# Patient Record
Sex: MALE | Race: WHITE | HISPANIC OR LATINO | Employment: FULL TIME | URBAN - NONMETROPOLITAN AREA
[De-identification: names, ages, dates, MRNs, and addresses within clinical notes are randomized per-mention and may not be internally consistent; named-entity substitution may affect disease eponyms.]

---

## 2024-09-28 ENCOUNTER — HOSPITAL ENCOUNTER (EMERGENCY)
Facility: HOSPITAL | Age: 44
Discharge: HOME | End: 2024-09-28
Attending: STUDENT IN AN ORGANIZED HEALTH CARE EDUCATION/TRAINING PROGRAM
Payer: OTHER MISCELLANEOUS

## 2024-09-28 VITALS
OXYGEN SATURATION: 96 % | BODY MASS INDEX: 41.75 KG/M2 | SYSTOLIC BLOOD PRESSURE: 139 MMHG | HEART RATE: 89 BPM | DIASTOLIC BLOOD PRESSURE: 97 MMHG | RESPIRATION RATE: 20 BRPM | HEIGHT: 73 IN | WEIGHT: 315 LBS | TEMPERATURE: 97.7 F

## 2024-09-28 DIAGNOSIS — M54.6 ACUTE MIDLINE THORACIC BACK PAIN: Primary | ICD-10-CM

## 2024-09-28 PROCEDURE — 99283 EMERGENCY DEPT VISIT LOW MDM: CPT

## 2024-09-28 RX ORDER — CYCLOBENZAPRINE HCL 10 MG
10 TABLET ORAL ONCE
Status: COMPLETED | OUTPATIENT
Start: 2024-09-28 | End: 2024-09-28

## 2024-09-28 RX ORDER — LIDOCAINE 50 MG/G
1 PATCH TOPICAL EVERY 24 HOURS
Qty: 15 EACH | Refills: 0 | Status: SHIPPED | OUTPATIENT
Start: 2024-09-28

## 2024-09-28 RX ORDER — NAPROXEN 500 MG/1
500 TABLET ORAL 2 TIMES DAILY PRN
Qty: 14 TABLET | Refills: 0 | Status: SHIPPED | OUTPATIENT
Start: 2024-09-28

## 2024-09-28 RX ORDER — CYCLOBENZAPRINE HCL 5 MG
5 TABLET ORAL 3 TIMES DAILY PRN
Qty: 30 TABLET | Refills: 0 | Status: SHIPPED | OUTPATIENT
Start: 2024-09-28

## 2024-09-28 RX ORDER — LIDOCAINE 4 G/G
1 PATCH TOPICAL
Status: DISCONTINUED | OUTPATIENT
Start: 2024-09-28 | End: 2024-09-28 | Stop reason: HOSPADM

## 2024-09-28 RX ORDER — NAPROXEN 500 MG/1
500 TABLET ORAL ONCE
Status: COMPLETED | OUTPATIENT
Start: 2024-09-28 | End: 2024-09-28

## 2024-09-28 RX ADMIN — LIDOCAINE 1 PATCH: 4 PATCH TOPICAL at 19:23

## 2024-09-28 RX ADMIN — CYCLOBENZAPRINE 10 MG: 10 TABLET, FILM COATED ORAL at 19:23

## 2024-09-28 RX ADMIN — NAPROXEN 500 MG: 500 TABLET ORAL at 19:23

## 2024-09-28 NOTE — Clinical Note
[facility] EMERGENCY DEPARTMENT  [address] [address]  Phone: [phone]    Bladimri RuizGustavoAlexey was seen and treated in our emergency department on 9/28/2024.  He may return to work on 10/02/2024.  Return to duty as tolerated       Thank you for choosing [facility].    Neo Romero MD

## 2024-09-28 NOTE — Clinical Note
[facility] EMERGENCY DEPARTMENT  [address] [address]  Phone: [phone]    Bladimir RuizGustavoAlexey was seen and treated in our emergency department on 9/28/2024.  He may return to work on 09/30/2024.  Return to duty as tolerated       Thank you for choosing [facility].    Neo Romero MD

## 2024-09-28 NOTE — DISCHARGE INSTRUCTIONS
You were evaluated for back pain.  We got physical exam and were not concerned for a surgical issue.  We have treated you symptomatically and would recommend that you follow-up with your primary care doctor to ensure that your back pain improves appropriately.  As we discussed, we believe that this pain is secondary to muscle strain.  If you have any weakness in your legs or other neurologic symptoms, please come back to the my department for further evaluation.  You are now stable for discharge.

## 2024-09-28 NOTE — ED PROVIDER NOTES
Subjective:  History of Present Illness:    Patient is a 43-year-old male with history of hypertension who presents today with back injury.  Reports he was at work and hurt his back while pulling on a chain.  Says that he has pain to the midline of his back.  Denies any trauma to the area.  No history of cancer.  No history of IV drug use.  No history of chronic steroid use.  Denies any history of malignancy.  No history of sepsis.  Juarez to our emergency department with no focal weakness      Nurses Notes reviewed and agree, including vitals, allergies, social history and prior medical history.     REVIEW OF SYSTEMS: All systems reviewed and not pertinent unless noted.  Review of Systems   Constitutional:  Positive for activity change. Negative for appetite change, chills, fatigue and fever.   HENT:  Negative for congestion, sinus pressure, sneezing and trouble swallowing.    Eyes:  Negative for discharge and itching.   Respiratory:  Negative for cough and shortness of breath.    Cardiovascular:  Negative for chest pain and palpitations.   Gastrointestinal:  Negative for abdominal distention and abdominal pain.   Endocrine: Negative for cold intolerance and heat intolerance.   Genitourinary:  Negative for decreased urine volume, dysuria and urgency.   Musculoskeletal:  Positive for back pain. Negative for gait problem, neck pain and neck stiffness.   Skin:  Negative for color change and rash.   Allergic/Immunologic: Negative for immunocompromised state.   Neurological:  Negative for facial asymmetry and headaches.   Hematological:  Negative for adenopathy.   Psychiatric/Behavioral:  Negative for self-injury and suicidal ideas.        History reviewed. No pertinent past medical history.    Allergies:    Morphine and Penicillins      History reviewed. No pertinent surgical history.      Social History     Socioeconomic History    Marital status:          History reviewed. No pertinent family  "history.    Objective  Physical Exam:  /97   Pulse 89   Temp 97.7 °F (36.5 °C) (Oral)   Resp 20   Ht 185.4 cm (73\")   Wt (!) 145 kg (319 lb)   SpO2 96%   BMI 42.09 kg/m²      Physical Exam  Constitutional:       General: He is not in acute distress.     Appearance: Normal appearance. He is normal weight. He is not ill-appearing.   HENT:      Head: Normocephalic and atraumatic.      Nose: Nose normal. No congestion or rhinorrhea.      Mouth/Throat:      Mouth: Mucous membranes are moist.      Pharynx: Oropharynx is clear.   Eyes:      Extraocular Movements: Extraocular movements intact.      Conjunctiva/sclera: Conjunctivae normal.      Pupils: Pupils are equal, round, and reactive to light.   Cardiovascular:      Rate and Rhythm: Normal rate and regular rhythm.      Pulses: Normal pulses.   Pulmonary:      Effort: Pulmonary effort is normal. No respiratory distress.      Breath sounds: Normal breath sounds.   Abdominal:      General: Abdomen is flat. Bowel sounds are normal. There is no distension.      Palpations: Abdomen is soft.      Tenderness: There is no abdominal tenderness.   Musculoskeletal:         General: Tenderness and signs of injury present. No swelling. Normal range of motion.      Cervical back: Normal range of motion and neck supple. No rigidity or tenderness.      Comments: Tenderness palpation to the thoracic spine with no obvious step-off or deformity noted   Skin:     General: Skin is warm and dry.      Capillary Refill: Capillary refill takes less than 2 seconds.   Neurological:      General: No focal deficit present.      Mental Status: He is alert and oriented to person, place, and time. Mental status is at baseline.      Cranial Nerves: No cranial nerve deficit.      Sensory: No sensory deficit.      Motor: No weakness.   Psychiatric:         Mood and Affect: Mood normal.         Behavior: Behavior normal.         Thought Content: Thought content normal.         Judgment: " Judgment normal.         Procedures    ED Course:         Lab Results (last 24 hours)       ** No results found for the last 24 hours. **             No radiology results from the last 24 hrs       MDM      Initial impression of presenting illness: Back pain    DDX: includes but is not limited to: Muscle strain, compression fracture, epidural abscess    Patient arrives stable with vitals interpreted by myself.     Pertinent features from physical exam: To auscultation, regular rate and rhythm, no murmur, nontender to abdominal palpation, patient endorsing tenderness to palpation to the thoracic spine with no obvious step-off or deformity.    Initial diagnostic plan: No need for further imaging    Results from initial plan were reviewed and interpreted by me revealing see above    Diagnostic information from other sources: This patient's wife at bedside    Interventions / Re-evaluation: Given naproxen, Flexeril, Lidoderm patch    Results/clinical rationale were discussed with patient at bedside    Consultations/Discussion of results with other physicians: Discussed diagnosis of back strain.  Will treat symptomatically and will have patient follow-up with his primary care doctor and occupational health for further management of this pain.  Strict return precaution for any unilateral weakness or new neurologic symptoms.    Disposition plan: Discharge  -----        Final diagnoses:   Acute midline thoracic back pain          Neo Romero MD  09/28/24 2014

## 2024-11-08 ENCOUNTER — OFFICE VISIT (OUTPATIENT)
Dept: NEUROLOGY | Facility: CLINIC | Age: 44
End: 2024-11-08
Payer: MEDICAID

## 2024-11-08 VITALS
HEART RATE: 92 BPM | RESPIRATION RATE: 14 BRPM | BODY MASS INDEX: 41.75 KG/M2 | DIASTOLIC BLOOD PRESSURE: 102 MMHG | TEMPERATURE: 98.2 F | HEIGHT: 73 IN | OXYGEN SATURATION: 97 % | SYSTOLIC BLOOD PRESSURE: 164 MMHG | WEIGHT: 315 LBS

## 2024-11-08 DIAGNOSIS — G43.011 INTRACTABLE MIGRAINE WITHOUT AURA AND WITH STATUS MIGRAINOSUS: Primary | ICD-10-CM

## 2024-11-08 DIAGNOSIS — G43.909 ACUTE MIGRAINE: ICD-10-CM

## 2024-11-08 RX ORDER — ESCITALOPRAM OXALATE 10 MG/1
1 TABLET ORAL DAILY
COMMUNITY
Start: 2024-09-09

## 2024-11-08 RX ORDER — NORTRIPTYLINE HYDROCHLORIDE 10 MG/1
10 CAPSULE ORAL
COMMUNITY
Start: 2024-10-11 | End: 2024-11-08 | Stop reason: SDUPTHER

## 2024-11-08 RX ORDER — LISINOPRIL AND HYDROCHLOROTHIAZIDE 20; 25 MG/1; MG/1
1 TABLET ORAL DAILY
COMMUNITY
Start: 2024-09-09

## 2024-11-08 RX ORDER — PROPRANOLOL HCL 20 MG
20 TABLET ORAL 2 TIMES DAILY
Qty: 60 TABLET | Refills: 3 | Status: SHIPPED | OUTPATIENT
Start: 2024-11-08

## 2024-11-08 RX ORDER — RIMEGEPANT SULFATE 75 MG/75MG
75 TABLET, ORALLY DISINTEGRATING ORAL AS NEEDED
Qty: 3 TABLET | Refills: 0 | COMMUNITY
Start: 2024-11-08

## 2024-11-08 RX ORDER — NORTRIPTYLINE HYDROCHLORIDE 10 MG/1
10 CAPSULE ORAL
Qty: 30 CAPSULE | Refills: 3 | Status: SHIPPED | OUTPATIENT
Start: 2024-11-08

## 2024-11-08 RX ORDER — RIMEGEPANT SULFATE 75 MG/75MG
75 TABLET, ORALLY DISINTEGRATING ORAL DAILY PRN
Qty: 9 TABLET | Refills: 3 | Status: SHIPPED | OUTPATIENT
Start: 2024-11-08

## 2024-11-08 RX ORDER — PANTOPRAZOLE SODIUM 40 MG/1
1 TABLET, DELAYED RELEASE ORAL DAILY
COMMUNITY
Start: 2024-09-09

## 2024-11-08 RX ORDER — ROSUVASTATIN CALCIUM 20 MG/1
1 TABLET, COATED ORAL DAILY
COMMUNITY
Start: 2024-09-09

## 2024-11-08 NOTE — PROGRESS NOTES
New Patient Office Visit      Patient Name: Bladimir Uribe  : 1980   MRN: [id number]     Referring Physician: Zoe Goldberg*    Chief Complaint:    Chief Complaint   Patient presents with    Establish Care     HA; pt reports he has had HA for about a year. Pt reports 30/30 HA days.    Medications tried: tylenol, aleve.        History of Present Illness: Bladimir Uribe is a 43 y.o. male who is here today to establish care with Neurology.  Has never been seen before neurology.  He was referred to us from [facility] for migraine HA. He was in the ER on 24.     He co HA that is a mixed sharp and dull pain that begins to forehead and temples and radiates to entire skull. HA often left sided. Can be ice pick liked pain. HA can begin like a vice and feels like the top of his head is going to pop off. + light and sounds sensitivity. + N. No aura. Triggers: unknown. Wakes in AM with HA. He takes OTC Tylenol and this will not terminate his migraines. He has been suffering from these HA since TBI in . He feels they have worsened in the past 1 year. Acute HA can effect his speech and he struggles with word finding when in pain. HA can last up to 7 days. Medications tried and failed: Lexapro, lisinopril, nortriptyline.    SOCIAL: Happily , [place]. Three children-adult. Works FT . No tobacco or nicotine products. No ETOH. No recreational drugs or cannabis.     [facility] Neuro:Paternal Grandmother- AD, Paternal Grandfather- Dementia, Father- migraines     Recent Imaging: None    Pertinent Medical History: \[facility]\"" seizures, TBI/work injury , AMBREEN with CPAP therapy, kidney stones    Subjective      Review of Systems:   Review of Systems   Constitutional: Negative.    HENT: Negative.     Eyes: Negative.    Respiratory: Negative.     Cardiovascular: Negative.    Gastrointestinal: Negative.    Endocrine: Negative.    Genitourinary: Negative.    Musculoskeletal: Negative.     Skin: Negative.    Allergic/Immunologic: Negative.    Neurological:  Positive for headache.   Hematological: Negative.    Psychiatric/Behavioral: Negative.     All other systems reviewed and are negative.      Past Medical History:   Past Medical History:   Diagnosis Date    Anxiety     High cholesterol     Hypertension     Manic depression        Past Surgical History: History reviewed. No pertinent surgical history.    Family History:   Family History   Problem Relation Age of Onset    Dementia Paternal Grandmother        Social History:   Social History     Socioeconomic History    Marital status:    Tobacco Use    Smoking status: Never   Substance and Sexual Activity    Alcohol use: Never    Drug use: Never       Medications:     Current Outpatient Medications:     escitalopram (LEXAPRO) 10 MG tablet, Take 1 tablet by mouth Daily., Disp: , Rfl:     lisinopril-hydrochlorothiazide (PRINZIDE,ZESTORETIC) 20-25 MG per tablet, Take 1 tablet by mouth Daily., Disp: , Rfl:     nortriptyline (PAMELOR) 10 MG capsule, Take 1 capsule by mouth every night at bedtime., Disp: 30 capsule, Rfl: 3    pantoprazole (PROTONIX) 40 MG EC tablet, Take 1 tablet by mouth Daily., Disp: , Rfl:     rosuvastatin (CRESTOR) 20 MG tablet, Take 1 tablet by mouth Daily., Disp: , Rfl:     cyclobenzaprine (FLEXERIL) 5 MG tablet, Take 1 tablet by mouth 3 (Three) Times a Day As Needed for Muscle Spasms. (Patient not taking: Reported on 11/8/2024), Disp: 30 tablet, Rfl: 0    lidocaine (LIDODERM) 5 %, Place 1 patch on the skin as directed by provider Daily. Remove & Discard patch within 12 hours or as directed by MD (Patient not taking: Reported on 11/8/2024), Disp: 15 each, Rfl: 0    naproxen (NAPROSYN) 500 MG tablet, Take 1 tablet by mouth 2 (Two) Times a Day As Needed for Mild Pain or Moderate Pain. (Patient not taking: Reported on 11/8/2024), Disp: 14 tablet, Rfl: 0    propranolol (INDERAL) 20 MG tablet, Take 1 tablet by mouth 2 (Two) Times  "a Day., Disp: 60 tablet, Rfl: 3    Rimegepant Sulfate (Nurtec) 75 MG tablet dispersible tablet, Take 1 tablet by mouth Daily As Needed (migraine)., Disp: 9 tablet, Rfl: 3    Rimegepant Sulfate (Nurtec) 75 MG tablet dispersible tablet, Take 1 tablet by mouth As Needed (migraine)., Disp: 3 tablet, Rfl: 0    Allergies:   Allergies   Allergen Reactions    Morphine Other (See Comments)     Pt reports burning sensation    Penicillins Swelling    Latex Rash       Objective     Physical Exam:  Vital Signs:   Vitals:    11/08/24 1011   BP: (!) 164/102   BP Location: Right arm   Patient Position: Sitting   Cuff Size: Adult   Pulse: 92   Resp: 14   Temp: 98.2 °F (36.8 °C)   TempSrc: Infrared   SpO2: 97%   Weight: (!) 146 kg (322 lb 12.8 oz)   Height: 185.4 cm (72.99\")   PainSc:   6   PainLoc: Head     Body mass index is 42.6 kg/m².     Physical Exam  Vitals and nursing note reviewed.   Constitutional:       General: He is not in acute distress.     Appearance: Normal appearance.   HENT:      Head: Normocephalic.      Nose: Nose normal.      Mouth/Throat:      Mouth: Mucous membranes are moist.      Pharynx: Oropharynx is clear.   Eyes:      General: Lids are normal.      Extraocular Movements: Extraocular movements intact.      Conjunctiva/sclera: Conjunctivae normal.      Pupils: Pupils are equal, round, and reactive to light.   Musculoskeletal:      Cervical back: Normal range of motion and neck supple.   Skin:     General: Skin is warm and dry.      Capillary Refill: Capillary refill takes less than 2 seconds.   Neurological:      General: No focal deficit present.      Mental Status: He is alert and oriented to person, place, and time.   Psychiatric:         Mood and Affect: Mood normal.         Behavior: Behavior normal.         Neurological Exam  Mental Status  Alert. Oriented to person, place, and time.    Cranial Nerves  CN II: Visual acuity is normal. Visual fields full to confrontation.  CN III, IV, VI: Extraocular " movements intact bilaterally. Normal lids and orbits bilaterally. Pupils equal round and reactive to light bilaterally.  CN V: Facial sensation is normal.  CN VII: Full and symmetric facial movement.  CN IX, X: Palate elevates symmetrically. Normal gag reflex.  CN XI: Shoulder shrug strength is normal.  CN XII: Tongue midline without atrophy or fasciculations.      PHQ-9 Total Score:       Assessment / Plan      Assessment/Plan:   Diagnoses and all orders for this visit:    1. Intractable migraine without aura and with status migrainosus (Primary)  -     propranolol (INDERAL) 20 MG tablet; Take 1 tablet by mouth 2 (Two) Times a Day.  Dispense: 60 tablet; Refill: 3  -     MRI Brain With & Without Contrast; Future  -     nortriptyline (PAMELOR) 10 MG capsule; Take 1 capsule by mouth every night at bedtime.  Dispense: 30 capsule; Refill: 3    2. Acute migraine  -     Rimegepant Sulfate (Nurtec) 75 MG tablet dispersible tablet; Take 1 tablet by mouth Daily As Needed (migraine).  Dispense: 9 tablet; Refill: 3         Follow Up:   Return in about 3 months (around 2/8/2025), or if symptoms worsen or fail to improve.    Anticipatory Guidance and Safety Reviewed  Patient Education Provided   Continue nortriptyline 10 mg QHS (prevention); SE reviewed   Begin Propranolol 20 mg BID (prevention); SE reviewed   MRI Brain W/WO r/o demyelinating disease  Nurtec 75 mg PRN (abortive); SE reviewed.  Samples and coupons provided with first dose given in office.  MIDAS score 117 (severe disability)    RTC PRN or within 12 weeks or sooner with issues     AMBROCIO Summers  [facility] Neurology and Sleep Medicine

## 2024-11-11 ENCOUNTER — PATIENT ROUNDING (BHMG ONLY) (OUTPATIENT)
Dept: NEUROLOGY | Facility: CLINIC | Age: 44
End: 2024-11-11
Payer: MEDICAID

## 2024-12-06 DIAGNOSIS — G43.909 ACUTE MIGRAINE: Primary | ICD-10-CM

## 2024-12-06 RX ORDER — SUMATRIPTAN 50 MG/1
50 TABLET, FILM COATED ORAL ONCE AS NEEDED
Qty: 9 TABLET | Refills: 2 | Status: SHIPPED | OUTPATIENT
Start: 2024-12-06

## 2024-12-27 ENCOUNTER — HOSPITAL ENCOUNTER (OUTPATIENT)
Dept: MRI IMAGING | Facility: HOSPITAL | Age: 44
Discharge: HOME | End: 2024-12-27
Payer: MEDICAID

## 2024-12-27 DIAGNOSIS — G43.011 INTRACTABLE MIGRAINE WITHOUT AURA AND WITH STATUS MIGRAINOSUS: ICD-10-CM

## 2024-12-27 PROCEDURE — A9577 INJ MULTIHANCE: HCPCS | Performed by: NURSE PRACTITIONER

## 2024-12-27 PROCEDURE — 70553 MRI BRAIN STEM W/O & W/DYE: CPT

## 2024-12-27 PROCEDURE — 25510000002 GADOBENATE DIMEGLUMINE 529 MG/ML SOLUTION: Performed by: NURSE PRACTITIONER

## 2024-12-27 RX ADMIN — GADOBENATE DIMEGLUMINE 25 ML: 529 INJECTION, SOLUTION INTRAVENOUS at 13:56

## 2025-03-28 ENCOUNTER — OFFICE VISIT (OUTPATIENT)
Dept: NEUROLOGY | Facility: CLINIC | Age: 45
End: 2025-03-28
Payer: MEDICAID

## 2025-03-28 VITALS
HEIGHT: 73 IN | HEART RATE: 75 BPM | SYSTOLIC BLOOD PRESSURE: 138 MMHG | WEIGHT: 315 LBS | BODY MASS INDEX: 41.75 KG/M2 | OXYGEN SATURATION: 98 % | DIASTOLIC BLOOD PRESSURE: 92 MMHG | RESPIRATION RATE: 14 BRPM | TEMPERATURE: 97.7 F

## 2025-03-28 DIAGNOSIS — G43.011 INTRACTABLE MIGRAINE WITHOUT AURA AND WITH STATUS MIGRAINOSUS: Primary | ICD-10-CM

## 2025-03-28 DIAGNOSIS — G93.0 ARACHNOID CYST: ICD-10-CM

## 2025-03-28 DIAGNOSIS — G43.909 ACUTE MIGRAINE: ICD-10-CM

## 2025-03-28 RX ORDER — NORTRIPTYLINE HYDROCHLORIDE 10 MG/1
10 CAPSULE ORAL
Qty: 30 CAPSULE | Refills: 3 | Status: SHIPPED | OUTPATIENT
Start: 2025-03-28

## 2025-03-28 RX ORDER — ATOGEPANT 60 MG/1
60 TABLET ORAL DAILY
Qty: 30 TABLET | Refills: 6 | Status: SHIPPED | OUTPATIENT
Start: 2025-03-28

## 2025-03-28 RX ORDER — RIZATRIPTAN BENZOATE 10 MG/1
10 TABLET ORAL ONCE AS NEEDED
Qty: 9 TABLET | Refills: 2 | Status: SHIPPED | OUTPATIENT
Start: 2025-03-28

## 2025-03-28 NOTE — PROGRESS NOTES
Follow Up Office Visit      Patient Name: Bladimir Uribe  : 1980   MRN: [id number]     Chief Complaint:    Chief Complaint   Patient presents with    Follow-up     Migraine; pt reports  HA days    Results     MRI Brain       History of Present Illness: Bladimir Uribe is a 44 y.o. male who is here today to follow up with neurology for migraine HA. He was last seen in clinic  ([place]).     [facility] . H has been taking Nortriptyline 10 mg QHS for prevention and reports good tolerance and compliance. He was started on Propranolol 20 mg BID and has not noticed improvement in HA frequency or intensity. He was given Imitrex 50 mg PRN and cannot take medication due to warning of death and side effect profile. He was provided with samples of Nurtec 75 mg PRN and this terminated his migraines without issue quickly; he loves this medication. He did complete MRI and would like to discuss results today. Medications tried and failed: Lexapro, lisinopril, nortriptyline, propranolol, Imitrex, Nurtec.     Recent Imaging:     MRI Brain W/WO  + An arachnoid cyst is noted along the anterior left temporal horn.      Pertinent Medical History: ETOH seizures, TBI/work injury , AMBREEN with CPAP therapy, kidney stones    Subjective      Review of Systems:   Review of Systems   Constitutional: Negative.    HENT: Negative.     Eyes: Negative.    Respiratory: Negative.     Cardiovascular: Negative.    Gastrointestinal: Negative.    Endocrine: Negative.    Genitourinary: Negative.    Musculoskeletal: Negative.    Skin: Negative.    Allergic/Immunologic: Negative.    Neurological:  Positive for headache.   Hematological: Negative.    Psychiatric/Behavioral: Negative.     All other systems reviewed and are negative.      I have reviewed and the following portions of the patient's history were updated as appropriate: past family history, past medical history, past social history, past surgical history and problem  "list.    Medications:     Current Outpatient Medications:     escitalopram (LEXAPRO) 10 MG tablet, Take 1 tablet by mouth Daily., Disp: , Rfl:     lisinopril-hydrochlorothiazide (PRINZIDE,ZESTORETIC) 20-25 MG per tablet, Take 1 tablet by mouth Daily., Disp: , Rfl:     nortriptyline (PAMELOR) 10 MG capsule, Take 1 capsule by mouth every night at bedtime., Disp: 30 capsule, Rfl: 3    pantoprazole (PROTONIX) 40 MG EC tablet, Take 1 tablet by mouth Daily., Disp: , Rfl:     rosuvastatin (CRESTOR) 20 MG tablet, Take 1 tablet by mouth Daily., Disp: , Rfl:     Atogepant (Qulipta) 60 MG tablet, Take 1 tablet by mouth Daily., Disp: 30 tablet, Rfl: 6    cyclobenzaprine (FLEXERIL) 5 MG tablet, Take 1 tablet by mouth 3 (Three) Times a Day As Needed for Muscle Spasms. (Patient not taking: Reported on 11/8/2024), Disp: 30 tablet, Rfl: 0    lidocaine (LIDODERM) 5 %, Place 1 patch on the skin as directed by provider Daily. Remove & Discard patch within 12 hours or as directed by MD (Patient not taking: Reported on 11/8/2024), Disp: 15 each, Rfl: 0    naproxen (NAPROSYN) 500 MG tablet, Take 1 tablet by mouth 2 (Two) Times a Day As Needed for Mild Pain or Moderate Pain. (Patient not taking: Reported on 11/8/2024), Disp: 14 tablet, Rfl: 0    rizatriptan (Maxalt) 10 MG tablet, Take 1 tablet by mouth 1 (One) Time As Needed for Migraine. May repeat in 2 hours if needed, Disp: 9 tablet, Rfl: 2    Allergies:   Allergies   Allergen Reactions    Morphine Other (See Comments)     Pt reports burning sensation    Penicillins Swelling    Latex Rash       Objective     Physical Exam:  Vital Signs:   Vitals:    03/28/25 0819   BP: 138/92   BP Location: Right arm   Patient Position: Sitting   Cuff Size: Adult   Pulse: 75   Resp: 14   Temp: 97.7 °F (36.5 °C)   TempSrc: Temporal   SpO2: 98%   Weight: (!) 149 kg (328 lb 9.6 oz)   Height: 185.4 cm (72.99\")   PainSc: 6    PainLoc: Head     Body mass index is 43.36 kg/m².    Physical Exam  Vitals and " nursing note reviewed.   Constitutional:       General: He is not in acute distress.     Appearance: Normal appearance.   HENT:      Head: Normocephalic.      Mouth/Throat:      Mouth: Mucous membranes are moist.      Pharynx: Oropharynx is clear.   Eyes:      Extraocular Movements: Extraocular movements intact.      Conjunctiva/sclera: Conjunctivae normal.   Musculoskeletal:      Cervical back: Normal range of motion and neck supple.   Skin:     General: Skin is warm and dry.      Capillary Refill: Capillary refill takes less than 2 seconds.   Neurological:      General: No focal deficit present.      Mental Status: He is alert and oriented to person, place, and time.   Psychiatric:         Mood and Affect: Mood normal.         Behavior: Behavior normal.         Neurological Exam  Mental Status  Alert. Oriented to person, place, and time.    Cranial Nerves  CN III, IV, VI: Extraocular movements intact bilaterally.       Assessment / Plan      Assessment/Plan:   Diagnoses and all orders for this visit:    1. Intractable migraine without aura and with status migrainosus (Primary)  -     Atogepant (Qulipta) 60 MG tablet; Take 1 tablet by mouth Daily.  Dispense: 30 tablet; Refill: 6  -     nortriptyline (PAMELOR) 10 MG capsule; Take 1 capsule by mouth every night at bedtime.  Dispense: 30 capsule; Refill: 3    2. Acute migraine  -     rizatriptan (Maxalt) 10 MG tablet; Take 1 tablet by mouth 1 (One) Time As Needed for Migraine. May repeat in 2 hours if needed  Dispense: 9 tablet; Refill: 2    3. Arachnoid cyst         Follow Up:   Return in about 3 months (around 6/28/2025), or if symptoms worsen or fail to improve.    Anticipatory Guidance and Safety Reviewed  Patient Education Provided   Continue nortriptyline 10 mg QHS (prevention); SE reviewed   DC Propranolol  DC Imitrex  Begin Qulipta 60 mg Daily (prevention); SE reviewed. Samples and coupons provided  Begin Maxalt 10 mg PRN (abortive); SE reviewed.   Nurtec 75  mg PRN (abortive); SE reviewed. Samples provided.  Reviewed MRI results and discussed     RTC PRN or within 12 weeks or sooner with issues     Olya Nazario, MARIFER, APRN, FNP-C  [facility] Neurology and Sleep Medicine

## 2025-08-06 ENCOUNTER — APPOINTMENT (OUTPATIENT)
Dept: CT IMAGING | Facility: HOSPITAL | Age: 45
End: 2025-08-06
Payer: COMMERCIAL

## 2025-08-06 ENCOUNTER — HOSPITAL ENCOUNTER (EMERGENCY)
Facility: HOSPITAL | Age: 45
Discharge: HOME | End: 2025-08-06
Attending: EMERGENCY MEDICINE | Admitting: EMERGENCY MEDICINE
Payer: COMMERCIAL

## 2025-08-06 ENCOUNTER — APPOINTMENT (OUTPATIENT)
Dept: GENERAL RADIOLOGY | Facility: HOSPITAL | Age: 45
End: 2025-08-06
Payer: COMMERCIAL

## 2025-08-06 VITALS
BODY MASS INDEX: 41.75 KG/M2 | SYSTOLIC BLOOD PRESSURE: 132 MMHG | HEIGHT: 73 IN | OXYGEN SATURATION: 96 % | WEIGHT: 315 LBS | DIASTOLIC BLOOD PRESSURE: 82 MMHG | HEART RATE: 76 BPM | RESPIRATION RATE: 18 BRPM | TEMPERATURE: 97.5 F

## 2025-08-06 DIAGNOSIS — K76.0 HEPATIC STEATOSIS: Primary | ICD-10-CM

## 2025-08-06 DIAGNOSIS — M25.531 RIGHT WRIST PAIN: ICD-10-CM

## 2025-08-06 DIAGNOSIS — M25.512 ACUTE PAIN OF LEFT SHOULDER: ICD-10-CM

## 2025-08-06 DIAGNOSIS — M54.50 LUMBAR BACK PAIN: ICD-10-CM
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
## 2025-08-06 PROCEDURE — 73110 X-RAY EXAM OF WRIST: CPT

## 2025-08-06 PROCEDURE — 96372 THER/PROPH/DIAG INJ SC/IM: CPT

## 2025-08-06 PROCEDURE — 25010000002 KETOROLAC TROMETHAMINE PER 15 MG: Performed by: EMERGENCY MEDICINE

## 2025-08-06 PROCEDURE — 72131 CT LUMBAR SPINE W/O DYE: CPT

## 2025-08-06 PROCEDURE — 73030 X-RAY EXAM OF SHOULDER: CPT

## 2025-08-06 PROCEDURE — 72192 CT PELVIS W/O DYE: CPT

## 2025-08-06 PROCEDURE — 99284 EMERGENCY DEPT VISIT MOD MDM: CPT | Performed by: EMERGENCY MEDICINE

## 2025-08-06 RX ORDER — CYCLOBENZAPRINE HCL 10 MG
10 TABLET ORAL ONCE
Status: COMPLETED | OUTPATIENT
Start: 2025-08-06 | End: 2025-08-06

## 2025-08-06 RX ORDER — IBUPROFEN 600 MG/1
600 TABLET, FILM COATED ORAL 3 TIMES DAILY
Qty: 15 TABLET | Refills: 0 | Status: SHIPPED | OUTPATIENT
Start: 2025-08-06

## 2025-08-06 RX ORDER — KETOROLAC TROMETHAMINE 30 MG/ML
30 INJECTION, SOLUTION INTRAMUSCULAR; INTRAVENOUS ONCE
Status: COMPLETED | OUTPATIENT
Start: 2025-08-06 | End: 2025-08-06

## 2025-08-06 RX ORDER — CYCLOBENZAPRINE HCL 5 MG
5 TABLET ORAL 3 TIMES DAILY PRN
Qty: 15 TABLET | Refills: 0 | Status: SHIPPED | OUTPATIENT
Start: 2025-08-06

## 2025-08-06 RX ADMIN — CYCLOBENZAPRINE HYDROCHLORIDE 10 MG: 10 TABLET, FILM COATED ORAL at 08:43

## 2025-08-06 RX ADMIN — KETOROLAC TROMETHAMINE 30 MG: 30 INJECTION INTRAMUSCULAR; INTRAVENOUS at 06:44
